# Patient Record
Sex: FEMALE
[De-identification: names, ages, dates, MRNs, and addresses within clinical notes are randomized per-mention and may not be internally consistent; named-entity substitution may affect disease eponyms.]

---

## 2017-09-30 ENCOUNTER — HOSPITAL ENCOUNTER (EMERGENCY)
Dept: HOSPITAL 14 - H.ER | Age: 27
Discharge: HOME | End: 2017-09-30
Payer: COMMERCIAL

## 2017-09-30 VITALS
OXYGEN SATURATION: 100 % | DIASTOLIC BLOOD PRESSURE: 86 MMHG | RESPIRATION RATE: 20 BRPM | SYSTOLIC BLOOD PRESSURE: 147 MMHG

## 2017-09-30 VITALS — HEART RATE: 104 BPM | TEMPERATURE: 100.7 F

## 2017-09-30 DIAGNOSIS — J02.0: Primary | ICD-10-CM

## 2017-09-30 PROCEDURE — 81025 URINE PREGNANCY TEST: CPT

## 2017-09-30 PROCEDURE — 99281 EMR DPT VST MAYX REQ PHY/QHP: CPT

## 2017-09-30 PROCEDURE — 96372 THER/PROPH/DIAG INJ SC/IM: CPT

## 2017-09-30 NOTE — ED PDOC
HPI: CCC, URI, Sore Throat


Time Seen by Provider: 17 14:33


Chief Complaint (Nursing): ENT Problem


Chief Complaint (Provider): Fever and Body Aches


History Per: Patient


History/Exam Limitations: no limitations


Have you had recent travel within the past 21 days to any of the following 

countries: [place], [place], [place] or [place]?: No


Onset/Duration Of Symptoms: Days


Current Symptoms Are (Timing): Still Present


Location Of Pain: Throat


Associated Symptoms: Fever


Additional Complaint(s): 


Reba Heller, a 27 year old female, presents to the ED complaining of fever, 

body aches and throat pain. The patient reports that she was recently diagnosed 

with a tonsil infection and was prescribed antibiotics but is unsure of the 

name of the antibiotics. She states that she took tylenol for pain around 11am 

this morning.








PMD: Edwin Mandujano








Past Medical History


Reviewed: Historical Data, Nursing Documentation, Vital Signs


Vital Signs: 


 Last Vital Signs











Temp  100.7 F H  17 15:38


 


Pulse  104 H  17 15:38


 


Resp  20   17 14:25


 


BP  147/86   17 14:25


 


Pulse Ox  100   17 16:01














- Medical History


PMH: No Chronic Diseases


   Denies: Chronic Kidney Disease





- Surgical History


Surgical History: Cholecystectomy





- Family History


Family History: States: Unknown Family Hx





- Immunization History


Hx Tetanus Toxoid Vaccination: No


Hx Influenza Vaccination: No


Hx Pneumococcal Vaccination: No





- Home Medications


Home Medications: 


 Ambulatory Orders











 Medication  Instructions  Recorded


 


Mag&Al/Simet/Diphen/Lido [First 30 ml MM DAILY #1 kit 17





Magic Mouthwash]  














- Allergies


Allergies/Adverse Reactions: 


 Allergies











Allergy/AdvReac Type Severity Reaction Status Date / Time


 


No Known Allergies Allergy   Verified 17 09:04














Review of Systems


ROS Statement: Except As Marked, All Systems Reviewed And Found Negative


Constitutional: Positive for: Fever, Other (body aches)


ENT: Positive for: Throat Pain





Physical Exam





- Reviewed


Nursing Documentation Reviewed: Yes


Vital Signs Reviewed: Yes





- Physical Exam


Appears: Positive for: Non-toxic, No Acute Distress


Head Exam: Positive for: ATRAUMATIC, NORMAL INSPECTION, NORMOCEPHALIC


Skin: Positive for: Normal Color, Warm, Dry.  Negative for: Rash


Eye Exam: Positive for: Normal appearance, EOMI, PERRL.  Negative for: Nystagmus


ENT: Positive for: Tonsillar Exudate, Tonsillar Swelling (enlarged tonsils), 

Other (Uvula swelling).  Negative for: Normal ENT Inspection


Lymphatic: Negative for: Normal Exam (Lymph Node swelling)


Neurologic/Psych: Positive for: Alert, Oriented, Gait





- ECG


O2 Sat by Pulse Oximetry: 100 (RA)


Pulse Ox Interpretation: Normal





Medical Decision Making


Medical Decision Makin


Initial Impression


27 year old female presenting with fever, throat pain and body aches in setting 

of known tonsilar infection








Initial Plan:


* Decadron inj 10mg 


* Motrin Tab 600mg PO


* Reevaluation








1444


Fever will be controlled. 


Patient advised to continue taking antibiotics which she was previously 

prescribed.





 Vital Signs - 24 hr











  17





  14:25 15:38 15:40


 


Temperature 102.1 F H 100.7 F H 


 


Pulse Rate 125 H 104 H 


 


Respiratory 20  





Rate   


 


Blood Pressure 147/86  


 


O2 Sat by Pulse 100  100





Oximetry   








VS improved will be d/c with f/u with pmd and magic mouth wash 





__________________________________________________________________


Scribe Attestation


Documented by Soha Gill acting as a scribe for Franchesca John PA-C. MD Scribe Attestation


All medical record entries made by the Scribe were at my direction and 

personally dictated by me. I have reviewed the chart and agree that the record 

accurately reflects my personal performance of the history, physical exam, 

medical decision making, and the department course for this patient. I have 

also personally directed, reviewed, and agree with the discharge instructions 

and disposition.





Disposition





- Clinical Impression


Clinical Impression: 


 Tonsillitis, Strep pharyngitis








- Patient ED Disposition


Is Patient to be Admitted: No


Counseled Patient/Family Regarding: Need For Followup, Rx Given





- Disposition


Referrals: 


[facility] [Provider Group]


Behin,Babak, MD [Staff Provider] - 


Disposition: Routine/Home


Disposition Time: 15:39


Condition: IMPROVED


Prescriptions: 


Mag&Al/Simet/Diphen/Lido [First Magic Mouthwash] 30 ml MM DAILY #1 kit


Instructions:  Strep Throat (ED)


Forms:  [employer] (English)


Print Language: Swedish





- POA


Present On Arrival: None

## 2018-02-10 ENCOUNTER — HOSPITAL ENCOUNTER (EMERGENCY)
Dept: HOSPITAL 14 - H.ER | Age: 28
LOS: 1 days | Discharge: HOME | End: 2018-02-11
Payer: COMMERCIAL

## 2018-02-10 VITALS
TEMPERATURE: 99.6 F | HEART RATE: 73 BPM | OXYGEN SATURATION: 100 % | RESPIRATION RATE: 16 BRPM | DIASTOLIC BLOOD PRESSURE: 69 MMHG | SYSTOLIC BLOOD PRESSURE: 113 MMHG
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
## 2018-02-10 DIAGNOSIS — S01.01XA: ICD-10-CM

## 2018-02-10 DIAGNOSIS — S39.92XA: Primary | ICD-10-CM

## 2018-02-10 PROCEDURE — 81025 URINE PREGNANCY TEST: CPT

## 2018-02-10 PROCEDURE — 99284 EMERGENCY DEPT VISIT MOD MDM: CPT

## 2018-02-10 PROCEDURE — 72070 X-RAY EXAM THORAC SPINE 2VWS: CPT

## 2018-02-10 PROCEDURE — 12001 RPR S/N/AX/GEN/TRNK 2.5CM/<: CPT

## 2018-02-10 PROCEDURE — 96372 THER/PROPH/DIAG INJ SC/IM: CPT

## 2018-02-10 PROCEDURE — 90471 IMMUNIZATION ADMIN: CPT

## 2018-02-10 PROCEDURE — 90715 TDAP VACCINE 7 YRS/> IM: CPT

## 2018-02-10 NOTE — ED PDOC
HPI: Back


Time Seen by Provider: 02/10/18 21:26


Chief Complaint (Nursing): Back Pain


Chief Complaint (Provider): Back Pain


History Per: Patient


History/Exam Limitations: no limitations


Onset/Duration Of Symptoms: Days (x 1)


Current Symptoms Are (Timing): Still Present


Additional Complaint(s): 





Reba is a 28 y/o female who presents to the ED after slipping and falling in 

her bathroom onto her back yesterday. Patient states the pain has worsened 

since yesterday, and she has been taking ibuprofen for the pain. Last ibuprofen 

was at 2pm. She admits she struck her head but denies loss of consciousness.





PMD: Edwin Cross





Past Medical History


Reviewed: Historical Data, Nursing Documentation, Vital Signs


Vital Signs: 


 Last Vital Signs











Temp  99.6 F   02/10/18 21:10


 


Pulse  73   02/10/18 21:10


 


Resp  16   02/10/18 21:10


 


BP  113/69   02/10/18 21:10


 


Pulse Ox  100   02/10/18 21:10














- Medical History


PMH: 


   Denies: Chronic Kidney Disease





- Surgical History


Surgical History: Cholecystectomy





- Family History


Family History: States: Unknown Family Hx





- Immunization History


Hx Tetanus Toxoid Vaccination: No


Hx Influenza Vaccination: No


Hx Pneumococcal Vaccination: No





- Home Medications


Home Medications: 


 Ambulatory Orders











 Medication  Instructions  Recorded


 


Mag&Al/Simet/Diphen/Lido [First 30 ml MM DAILY #1 kit 09/30/17





Magic Mouthwash]  


 


Naproxen 1 tab PO Q12 PRN #14 tab 02/10/18


 


diaZEpam [Valium] 5 mg PO Q6 PRN #4 tab 02/10/18














- Allergies


Allergies/Adverse Reactions: 


 Allergies











Allergy/AdvReac Type Severity Reaction Status Date / Time


 


No Known Allergies Allergy   Verified 02/10/18 21:10














Review of Systems


ROS Statement: Except As Marked, All Systems Reviewed And Found Negative


Musculoskeletal: Positive for: Back Pain





Physical Exam





- Reviewed


Nursing Documentation Reviewed: Yes


Vital Signs Reviewed: Yes





- Physical Exam


Appears: Positive for: Well, Non-toxic, No Acute Distress


Cardiovascular/Chest: Positive for: Chest Non Tender


Back: Positive for: Vertebral Tenderness (t12, t11 parathoracic region).  

Negative for: Other (swelling, echymosis, deformity)


Extremity: Positive for: Normal ROM


Neurologic/Psych: Positive for: Alert, Oriented





- ECG


O2 Sat by Pulse Oximetry: 100 (RA)


Pulse Ox Interpretation: Normal





Medical Decision Making


Medical Decision Making: 





Time: 21:26


Initial Impression: Back Injury


Initial Plan:


--Toradol


--XR Dorsal Thoracic Spine





--------------------------------------------------------------------------------

-----------------


Scribe Attestation:   


Documented by Kaz Han, acting as a scribe for Jo Jacobson PA-C





Provider Scribe Attestation:


All medical record entries made by the Scribe were at my direction and 

personally dictated by me. I have reviewed the chart and agree that the record 

accurately reflects my personal performance of the history, physical exam, 

medical decision making, and the department course for this patient. I have 

also personally directed, reviewed, and agree with the discharge instructions 

and disposition.





Disposition





- Clinical Impression


Clinical Impression: 


 Back contusion








- Patient ED Disposition


Is Patient to be Admitted: No





- Disposition


Disposition: Routine/Home


Disposition Time: 22:13


Condition: FAIR


Prescriptions: 


diaZEpam [Valium] 5 mg PO Q6 PRN #4 tab


 PRN Reason: Muscle Spasm


Naproxen 1 tab PO Q12 PRN #14 tab


 PRN Reason: Pain, Moderate (4-7)


Instructions:  Thoracic Back Strain (ED)


Forms:  [employer] [facility] (English), [facility] ED School/Work Excuse

## 2018-02-11 NOTE — RAD
HISTORY:

back injury







COMPARISON:

No prior.



FINDINGS:



BONES:

Alignment maintained. No fracture.



DISC SPACES:

Normal.



SOFT TISSUES:

Normal.



OTHER FINDINGS:

None.



IMPRESSION:

Normal radiographs of the thoracic spine.

## 2018-02-19 ENCOUNTER — HOSPITAL ENCOUNTER (EMERGENCY)
Dept: HOSPITAL 14 - H.ER | Age: 28
Discharge: HOME | End: 2018-02-19
Payer: COMMERCIAL

## 2018-02-19 VITALS
SYSTOLIC BLOOD PRESSURE: 114 MMHG | DIASTOLIC BLOOD PRESSURE: 75 MMHG | OXYGEN SATURATION: 100 % | TEMPERATURE: 98.1 F | RESPIRATION RATE: 16 BRPM | HEART RATE: 74 BPM

## 2018-02-19 DIAGNOSIS — Z48.02: Primary | ICD-10-CM

## 2018-10-19 ENCOUNTER — HOSPITAL ENCOUNTER (EMERGENCY)
Dept: HOSPITAL 14 - H.ER | Age: 28
Discharge: HOME | End: 2018-10-19
Payer: COMMERCIAL

## 2018-10-19 VITALS — DIASTOLIC BLOOD PRESSURE: 76 MMHG | SYSTOLIC BLOOD PRESSURE: 121 MMHG | HEART RATE: 90 BPM | TEMPERATURE: 98.6 F

## 2018-10-19 VITALS — OXYGEN SATURATION: 100 %

## 2018-10-19 VITALS — RESPIRATION RATE: 18 BRPM

## 2018-10-19 DIAGNOSIS — J02.0: ICD-10-CM

## 2018-10-19 DIAGNOSIS — R50.9: ICD-10-CM

## 2018-10-19 DIAGNOSIS — J03.90: Primary | ICD-10-CM

## 2018-10-19 PROCEDURE — 96372 THER/PROPH/DIAG INJ SC/IM: CPT

## 2018-10-19 PROCEDURE — 87430 STREP A AG IA: CPT

## 2018-10-19 PROCEDURE — 81025 URINE PREGNANCY TEST: CPT

## 2018-10-19 PROCEDURE — 99283 EMERGENCY DEPT VISIT LOW MDM: CPT

## 2018-10-19 NOTE — ED PDOC
HPI: CCC, URI, Sore Throat


Time Seen by Provider: 10/19/18 20:04


Chief Complaint (Nursing): ENT Problem


Chief Complaint (Provider): body aches and sore throat


History Per: Patient


History/Exam Limitations: no limitations


Onset/Duration Of Symptoms: Days (x1)


Current Symptoms Are (Timing): Still Present


Location Of Pain: Throat.  denies: Ear(s)


Sick Contacts (Context): None


Associated Symptoms: Fever, Chills, Sore Throat.  denies: Cough, Nausea, 

Vomiting, Diarrhea


Ear Symptoms: Bilateral: None


Additional Complaint(s): 





Reba Heller is a 28 year old female, with no significant past medical history,

who presents to the emergency department complaining of body aches, sore throat 

and fever onset since yesterday. Patient reports a Tmax of 103 and states her 

last measured temp was 101. She has been taking Ibuprofen for symptoms, last 

dose today 3pm. She denies any cough, congestion, nausea, vomit, diarrhea, ear 

pain, chest pain, shortness of breath, abdominal pain, sick contacts or recent 

travel. No further medical complaints.





PMD: Edwin Cross 





Past Medical History


Reviewed: Historical Data, Nursing Documentation, Vital Signs


Vital Signs: 





                                Last Vital Signs











Temp  101.5 F H  10/19/18 20:00


 


Pulse  121 H  10/19/18 20:00


 


Resp  20   10/19/18 20:00


 


BP  107/77   10/19/18 20:00


 


Pulse Ox  100   10/19/18 20:00














- Medical History


PMH: No Chronic Diseases





- Surgical History


Surgical History: Cholecystectomy





- Family History


Family History: States: Unknown Family Hx





- Social History


Current smoker - smoking cessation education provided: No


Alcohol: None


Drugs: Denies





- Home Medications


Home Medications: 


                                Ambulatory Orders











 Medication  Instructions  Recorded


 


RX: Mag&Al/Simet/Diphen/Lido 30 ml MM DAILY #1 kit 09/30/17





[First Magic Mouthwash]  


 


RX: Naproxen 1 tab PO Q12 PRN #14 tab 02/10/18


 


Cephalexin [Keflex] 500 mg PO QID #20 capsule 02/11/18


 


Acetaminophen [Acetaminophen 8 650 mg PO Q8 PRN #21 tablet.er 10/19/18





Hour]  


 


RX: Amoxicillin 875 mg PO BID #14 tablet 10/19/18


 


RX: Ibuprofen [Motrin Tab] 800 mg PO Q8 PRN #21 tab 10/19/18














- Allergies


Allergies/Adverse Reactions: 


                                    Allergies











Allergy/AdvReac Type Severity Reaction Status Date / Time


 


No Known Allergies Allergy   Verified 10/19/18 20:00














Review of Systems


ROS Statement: Except As Marked, All Systems Reviewed And Found Negative


Constitutional: Positive for: Fever, Chills, Other (body aches)


ENT: Positive for: Throat Pain.  Negative for: Ear Pain, Nose Congestion


Cardiovascular: Negative for: Chest Pain


Respiratory: Negative for: Cough, Shortness of Breath


Gastrointestinal: Negative for: Nausea, Vomiting, Abdominal Pain, Diarrhea





Physical Exam





- Reviewed


Nursing Documentation Reviewed: Yes


Vital Signs Reviewed: Yes





- Physical Exam


Comments: 


GENERAL APPEARANCE: Patient is awake, alert, oriented x 3, in no acute distress.

Resting comfortably.


SKIN:  Warm, dry; (-) cyanosis, (-) rash.


EYES:  (-) conjunctival pallor, (-) scleral icterus, (-) conjunctival 

hemorrhage.


ENMT:  Mucous membranes moist.  TMs: Non-bulging, (-) erythema.  Airway patent: 

(-) stridor.  Pharynx:  (+) bilateral tonsillar erythema, 2+ hypertrophy, (+) 

bilateral exudates. Uvula midline. (-) muffled voice (-) sinus tenderness


CARDIAC: (-) irregularity


RESPIRATORY: lungs clear to auscultation bilaterally (-) rales (-) rhonchi (-

)wheezing. Respirations even and nonlabored, speaks in full sentences.


NECK: Supple, FROM (-) tenderness, (-) stiffness, (-) meningismus, (+) anterior 

cervical lymphadenopathy.


ABDOMEN AND GI:  Soft; (-) tenderness


EXTREMITIES:  (-) deformity


NEURO AND PSYCH:  Mental status as above; (-) focal findings. Gait: steady. 

Speech: clear. 





- Laboratory Results


Urine Pregnancy POC: Negative





- ECG


O2 Sat by Pulse Oximetry: 100 (RA)


Pulse Ox Interpretation: Normal





Medical Decision Making


Medical Decision Making: 


Time: 20:00


Initial Impression: Tonsillitis, Fever


Initial Plan:


--Pregnancy test


--Amoxicillin 500 mg PO


--Decadron Inj 10 mg IM


--Tylenol 650 mg PO


--Rapid Strep Group A Antigen 


--Reevaluation





Upreg: negative 





2055


Rapid Strep: Positive.





2150


Repeat temp oral: 100.4


Repeat HR: 115


Ibuprofen 600mg PO ordered.





2300


Repeat HR: 90


Repeat Temp: 98.6 (oral)


On re-evaluation, patient reports improvement of symptoms. On exam, patient 

remains AAOx3, in no acute distress. Lungs clear to auscultation, cardiac RRR, 

abdomen soft, non-tender, repeat neuro exam shows no focal findings. Vitals 

stable. Patient educated on antipyretic use. 


Lab/Diagnostic results d/w the patient in great detail. Diagnosis of 

tonsillitis, fever, strep pharyngitis d/w the patient. 


Based on history, exam and diagnostic results, plan will be for outpatient 

follow up with PMD/ENT. 


Patient instructed to follow-up with pmd / referral provided / the clinic  in 1-

2 days without fail. Advised to take medication as prescribed. Return to the 

emergency room at any time for any new or worsening symptoms. Patient states she

fully agrees with and understands discharge instructions. States that she agrees

with the plan and disposition. Verbalized and repeated discharge instructions 

and plan. I have given the patient opportunity to ask any additional questions.


----------------------------------------------------------------------------

---------------------   


Scribe Attestation:


Documented by Chucky Reeves, acting as a scribe for Trudy Guevara PA-C





Provider Scribe Attestation:


All medical record entries made by the Scribe were at my direction and 

personally dictated by me. I have reviewed the chart and agree that the record 

accurately reflects my personal performance of the history, physical exam, 

medical decision making, and the department course for this patient. I have also

personally directed, reviewed, and agree with the discharge instructions and 

disposition.





Disposition





- Clinical Impression


Clinical Impression: 


 Tonsillitis, Fever, Strep pharyngitis








- Patient ED Disposition


Is Patient to be Admitted: No


Counseled Patient/Family Regarding: Studies Performed, Diagnosis, Need For 

Followup, Rx Given





- Disposition


Referrals: 


Edwin Cross MD [Family Provider] - 


Behin,Babak, MD [Staff Provider] - 


Disposition: Routine/Home


Disposition Time: 23:00


Condition: STABLE


Additional Instructions: 


The emergency medical care you received today was directed at your acute 

symptoms. If you were prescribed any medication, please fill it and take as 

directed. It may take several days for your symptoms to resolve. Return to the 

Emergency Department if your symptoms worsen, do not improve, or if you have any

other problems.





Please contact your doctor in 2 days for re-evaluation and follow up / or call 

one of the physicians/clinics you have been referred to that are listed on the 

Patient Visit Information form that is included in your discharge packet. Bring 

any paperwork you were given at discharge with you along with any medications 

you are taking to your follow up visit. Our treatment cannot replace ongoing 

medical care by a primary care provider (PCP) outside of the emergency 

department.


Prescriptions: 


Acetaminophen [Acetaminophen 8 Hour] 650 mg PO Q8 PRN #21 tablet.er


 PRN Reason: Fever >100.4 F


RX: Amoxicillin 875 mg PO BID #14 tablet


RX: Ibuprofen [Motrin Tab] 800 mg PO Q8 PRN #21 tab


 PRN Reason: Pain, Moderate (4-7)


Instructions:  Sore Throat, Adult (DC), Strep Throat (DC), Fever, Adult (DC)


Forms:  [facility] (English), [facility] ED School/Work Excuse


Print Language: ENGLISH





- POA


Present On Arrival: None





Results





- Lab Results


Lab Results: 

















  10/19/18





  20:28


 


Grp A Beta Strep Ag  Positive H

## 2020-12-08 NOTE — ED PDOC
HPI: Wound Care





- HPI


Time Seen by Provider: 02/19/18 20:09


Chief Complaint (Nursing): Suture/Staple Removal


Chief Complaint (Provider): Suture/Staple Removal


History Per: Patient


Exam Limitations: no limitations


Additional Complaint(s): 


Patient presents to the ED for suture removal. States sutures were placed on 2/

10/18 to her scalp. Patient reports cleaning the wound every 2-3 days.  

Otherwise: (-) fever, (-) chills, (-) discharge. 





PMD: Provider TBD 





Past Medical History


Reviewed: Historical Data, Nursing Documentation, Vital Signs


Vital Signs: 





 Last Vital Signs











Temp  98.1 F   02/19/18 19:34


 


Pulse  74   02/19/18 19:34


 


Resp  16   02/19/18 19:34


 


BP  114/75   02/19/18 19:34


 


Pulse Ox  100   02/19/18 19:34














- Medical History


PMH: 


   Denies: Chronic Kidney Disease





- Surgical History


Surgical History: Cholecystectomy





- Family History


Family History: States: Unknown Family Hx





- Immunization History


Hx Tetanus Toxoid Vaccination: No


Hx Influenza Vaccination: No


Hx Pneumococcal Vaccination: No





- Home Medications


Home Medications: 


 Ambulatory Orders











 Medication  Instructions  Recorded


 


Mag&Al/Simet/Diphen/Lido [First 30 ml MM DAILY #1 kit 09/30/17





Magic Mouthwash]  


 


Naproxen 1 tab PO Q12 PRN #14 tab 02/10/18


 


Cephalexin [Keflex] 500 mg PO QID #20 capsule 02/11/18














- Allergies


Allergies/Adverse Reactions: 


 Allergies











Allergy/AdvReac Type Severity Reaction Status Date / Time


 


No Known Allergies Allergy   Verified 02/10/18 21:10














Review of Systems


ROS Statement: Except As Marked, All Systems Reviewed And Found Negative


Constitutional: Negative for: Fever, Chills


Skin: Positive for: Lesions (sutured wound to scalp).  Negative for: Rash, 

Other (drainage)


Neurological: Negative for: Headache, Dizziness





Physical Exam





- Reviewed


Nursing Documentation Reviewed: Yes


Vital Signs Reviewed: Yes





- Physical Exam


Comments: 


GENERAL APPEARANCE: Patient is awake, alert, oriented x 3, in no acute distress.


SKIN:  Warm, dry; (-) cyanosis; (-) rash.


HEAD: Healing, stapled wound to parietal aspect of scalp. No discharge, 

redness. Good wound healing.


EYES:  (-) conjunctival pallor, (-) scleral icterus.








- ECG


O2 Sat by Pulse Oximetry: 100 (RA)


Pulse Ox Interpretation: Normal





Medical Decision Making


Medical Decision Making: 





Impression: Staple removal





Time: 20:31


Staples easily removed by PA, patient tolerated removal well. Advised patient 

to clean with soap and water.





Advised to follow up with primary care physician/the clinic as needed. Return 

to the emergency room at any time for any new or worsening symptoms.





Patient states she fully agrees with and understands discharge instructions. 

States that she agrees with the plan and disposition. Verbalized and repeated 

discharge instructions and plan. I have given the patient opportunity to ask 

any additional questions.





--------------------------------------------------------------------------------

-----------------


Scribe Attestation:   


Documented by Rhonda Wong, acting as a scribe for Do Murray PA-C





Provider Scribe Attestation:


All medical record entries made by the Scribe were at my direction and 

personally dictated by me. I have reviewed the chart and agree that the record 

accurately reflects my personal performance of the history, physical exam, 

medical decision making, and the department course for this patient. I have 

also personally directed, reviewed, and agree with the discharge instructions 

and disposition.





Disposition





- Clinical Impression


Clinical Impression: 


 Removal of staple, Visit for wound check








- Patient ED Disposition


Is Patient to be Admitted: No


Counseled Patient/Family Regarding: Diagnosis, Need For Followup





- Disposition


Referrals: 


[facility] [Outside]


Brian Gage MD [Staff Provider] - 


Disposition: Routine/Home


Disposition Time: 20:55


Condition: STABLE


Additional Instructions: 


Thank you for letting us take care of you today. You were treated for staple 

removal, wound check. The emergency medical care you received today was 

directed at your acute symptoms. Clean wound with regular soap and water. It 

may take several days for your symptoms to resolve. Return to the Emergency 

Department if your symptoms worsen, do not improve, or if you have any other 

problems.





Please contact your doctor in 2 days for re-evaluation and follow up / or call 

one of the physicians/clinics you have been referred to that are listed on the 

Patient Visit Information form that is included in your discharge packet. Bring 

any paperwork you were given at discharge with you along with any medications 

you are taking to your follow up visit. Our treatment cannot replace ongoing 

medical care by a primary care provider (PCP) outside of the emergency 

department.





Thank you for allowing the [facility] team to be part of your care today.





Instructions:  Wound Care (DC), Staple Removal


Forms:  [employer] (English), [facility] ED School/Work Excuse





- POA


Present On Arrival: None [phone]